# Patient Record
Sex: FEMALE
[De-identification: names, ages, dates, MRNs, and addresses within clinical notes are randomized per-mention and may not be internally consistent; named-entity substitution may affect disease eponyms.]

---

## 2019-03-08 ENCOUNTER — HOSPITAL ENCOUNTER (OUTPATIENT)
Dept: HOSPITAL 5 - CATHLABREC | Age: 34
Discharge: HOME | End: 2019-03-08
Attending: INTERNAL MEDICINE
Payer: COMMERCIAL

## 2019-03-08 VITALS — DIASTOLIC BLOOD PRESSURE: 89 MMHG | SYSTOLIC BLOOD PRESSURE: 114 MMHG

## 2019-03-08 DIAGNOSIS — Z82.61: ICD-10-CM

## 2019-03-08 DIAGNOSIS — N18.4: ICD-10-CM

## 2019-03-08 DIAGNOSIS — Z82.5: ICD-10-CM

## 2019-03-08 DIAGNOSIS — Z98.890: ICD-10-CM

## 2019-03-08 DIAGNOSIS — I12.0: Primary | ICD-10-CM

## 2019-03-08 DIAGNOSIS — Z79.899: ICD-10-CM

## 2019-03-08 LAB
APTT BLD: 20.3 SEC. (ref 24.2–36.6)
BASOPHILS # (AUTO): 0.1 K/MM3 (ref 0–0.1)
BASOPHILS NFR BLD AUTO: 1.5 % (ref 0–1.8)
EOSINOPHIL # BLD AUTO: 0.6 K/MM3 (ref 0–0.4)
EOSINOPHIL NFR BLD AUTO: 11.6 % (ref 0–4.3)
HCT VFR BLD CALC: 31.6 % (ref 30.3–42.9)
HGB BLD-MCNC: 11.1 GM/DL (ref 10.1–14.3)
INR PPP: 1.13 (ref 0.87–1.13)
LYMPHOCYTES # BLD AUTO: 1.2 K/MM3 (ref 1.2–5.4)
LYMPHOCYTES NFR BLD AUTO: 24.8 % (ref 13.4–35)
MCHC RBC AUTO-ENTMCNC: 35 % (ref 30–34)
MCV RBC AUTO: 84 FL (ref 79–97)
MONOCYTES # (AUTO): 0.4 K/MM3 (ref 0–0.8)
MONOCYTES % (AUTO): 7.2 % (ref 0–7.3)
PLATELET # BLD: 162 K/MM3 (ref 140–440)
RBC # BLD AUTO: 3.78 M/MM3 (ref 3.65–5.03)

## 2019-03-08 PROCEDURE — 50200 RENAL BIOPSY PERQ: CPT

## 2019-03-08 PROCEDURE — 77012 CT SCAN FOR NEEDLE BIOPSY: CPT

## 2019-03-08 PROCEDURE — 85025 COMPLETE CBC W/AUTO DIFF WBC: CPT

## 2019-03-08 PROCEDURE — 85730 THROMBOPLASTIN TIME PARTIAL: CPT

## 2019-03-08 PROCEDURE — 36415 COLL VENOUS BLD VENIPUNCTURE: CPT

## 2019-03-08 PROCEDURE — A4649 SURGICAL SUPPLIES: HCPCS

## 2019-03-08 PROCEDURE — 85610 PROTHROMBIN TIME: CPT

## 2019-03-08 NOTE — CAT SCAN REPORT
Exam: CT-guided biopsy of kidney



Clinical indication: Medical renal disease, unknown etiology



Date: 3/8/2019



Procedure: Following an explanation of risks, benefits and 

alternatives; written informed consent was obtained. The patient was 

brought to the CT suite and placed in prone position on the gantry. 

 images of back are obtained in the right kidney was chosen for 

biopsy secondary to a small interpretable system left kidney. Patient's 

right lower back was prepped and draped in usual sterile fashion. 1% 

lidocaine was used for anesthesia.



Using intermittent CT guidance, a 10 cm 18-gauge trocar needle was 

advanced to the margin of the lower pole of the right kidney. 

Confirmation was obtained using CT imaging. A single core biopsy was 

obtained. The biopsy was adequate. The biopsy needle was removed and 

the tract filled with Gelfoam pledgets. Post biopsy imaging 

demonstrated a small subcapsular hematoma.



The needle was removed and hemostasis achieved on the skin surface 

using manual compression. A sterile compression dressing was applied.



The patient tolerated the procedure well. There were no immediate post 

procedure complications.



Conscious sedation was performed on the guidance of radiologic nursing. 

Continuous cardiopulmonary monitoring was utilized.





Impression: CT-guided biopsy of right lower pole. Sample sent to 

pathology.

## 2019-03-08 NOTE — SHORT STAY SUMMARY
Short Stay Documentation


Date of service: 19





- History


Principal diagnosis: hypertension, CRF


Past Medical History: hypertension, other (new onset renal failure)


Past Surgical History: 


Social history: , lives with family





- Allergies and Medications


Current Medications: 


                                    Allergies





No Known Allergies Allergy (Unverified 19 06:59)


   





                                Home Medications











 Medication  Instructions  Recorded  Confirmed  Last Taken  Type


 


Metoprolol Succinate 50 mg PO DAILY 19 10:00 History





    50mg 


 


NIFEdipine [Nifedipine ER] 30 mg PO BID 19 21:00 History





    30mg 


 


hydrALAZINE [Apresoline TAB] 25 mg PO BID 19 07:00 

History





    25mg 








Active Medications





Fentanyl (Sublimaze)  100 mcg IV ONCE NR


   Stop: 19 16:00


Sodium Chloride (Nacl 0.9% 500 Ml)  500 mls @ 50 mls/hr IV AS DIRECT EBONI


   Last Admin: 19 08:35 Dose:  50 mls/hr


   Documented by: 


Midazolam HCl (Versed)  5 mg IV ONCE NR


   Stop: 19 16:00











- Physical exam


General appearance: no acute distress


Integumentary: no rash


HEENT: PERRLA


Lungs: Normal air movement


Breasts: deferred


Heart: Regular rate


Gastrointestinal: normal


Female Genitourinary: deferred


Rectal Exam: deferred


Extremities: no ischemia, No edema


Neurological: Normal gait, Normal speech





- Brief post op/procedure progress note


Date of procedure: 19


Pre-op diagnosis: Renal failure


Post-op diagnosis: same


Procedure: 





CT guided renal biopsy


Anesthesia: local


Surgeon: RONI MENDENHALL


Estimated blood loss: minimal


Pathology: none


Condition: stable





- Disposition


Condition at discharge: Good


Disposition: DC-01 TO HOME OR SELFCARE





Short Stay Discharge Plan


Activity: advance as tolerated


Weight Bearing Status: Weight Bear as Tolerated


Diet: regular


Wound: keep clean and dry, per your surgeon's advice


Follow up with: 


MAURIZIO PINTO MD [Primary Care Provider] - 7 Days